# Patient Record
Sex: MALE | Race: WHITE | NOT HISPANIC OR LATINO | Employment: OTHER | URBAN - METROPOLITAN AREA
[De-identification: names, ages, dates, MRNs, and addresses within clinical notes are randomized per-mention and may not be internally consistent; named-entity substitution may affect disease eponyms.]

---

## 2022-09-06 ENCOUNTER — OFFICE VISIT (OUTPATIENT)
Dept: FAMILY MEDICINE | Facility: CLINIC | Age: 62
End: 2022-09-06
Payer: COMMERCIAL

## 2022-09-06 VITALS
OXYGEN SATURATION: 98 % | WEIGHT: 205.19 LBS | TEMPERATURE: 98 F | RESPIRATION RATE: 18 BRPM | DIASTOLIC BLOOD PRESSURE: 78 MMHG | HEART RATE: 78 BPM | BODY MASS INDEX: 25.51 KG/M2 | SYSTOLIC BLOOD PRESSURE: 146 MMHG | HEIGHT: 75 IN

## 2022-09-06 DIAGNOSIS — Z12.11 COLON CANCER SCREENING: ICD-10-CM

## 2022-09-06 DIAGNOSIS — I10 PRIMARY HYPERTENSION: ICD-10-CM

## 2022-09-06 DIAGNOSIS — M79.662 PAIN OF LEFT CALF: Primary | ICD-10-CM

## 2022-09-06 PROCEDURE — 99202 OFFICE O/P NEW SF 15 MIN: CPT | Mod: S$GLB,,, | Performed by: INTERNAL MEDICINE

## 2022-09-06 PROCEDURE — 99202 PR OFFICE/OUTPT VISIT, NEW, LEVL II, 15-29 MIN: ICD-10-PCS | Mod: S$GLB,,, | Performed by: INTERNAL MEDICINE

## 2022-09-06 RX ORDER — ASPIRIN 81 MG/1
81 TABLET ORAL DAILY
COMMUNITY

## 2022-09-06 RX ORDER — LISINOPRIL 10 MG/1
10 TABLET ORAL DAILY
COMMUNITY
End: 2022-09-06 | Stop reason: SDUPTHER

## 2022-09-06 RX ORDER — ZOLPIDEM TARTRATE 10 MG/1
10 TABLET ORAL NIGHTLY PRN
Qty: 30 TABLET | Refills: 1 | Status: SHIPPED | OUTPATIENT
Start: 2022-09-06

## 2022-09-06 RX ORDER — ZOLPIDEM TARTRATE 10 MG/1
5 TABLET ORAL NIGHTLY PRN
COMMUNITY
End: 2022-09-06 | Stop reason: SDUPTHER

## 2022-09-06 RX ORDER — LISINOPRIL 10 MG/1
10 TABLET ORAL DAILY
Qty: 90 TABLET | Refills: 1 | Status: SHIPPED | OUTPATIENT
Start: 2022-09-06 | End: 2023-01-30

## 2022-09-06 NOTE — PROGRESS NOTES
"Subjective:       Patient ID: Jose Luis Mohan is a 62 y.o. male.    Chief Complaint: Establish Care and Leg Pain (Leg and calf pain)    Here to establish care.  He has a h/o HTN managed by Cardiology but not followed by PCP.  BP normally well controlled and he actually takes meds every other day.  He is nervous today.  He recently noted some pain in is left calf described as a "frog".  It has slowly improved but a friend of his is a nurse and told him it could be clot.  He reports that his dad and a h/o clots in his legs; ? DVT.  The pain tends to worsen towards end of the day but overall it is slowly improving.  Worse depending on type of shoes he wears.  Walks all day for work.  No recent trips or prolonged immobility.  He brought some labs from February when he had an episode of acute knee pain.      Leg Pain   The incident occurred more than 1 week ago. There was no injury mechanism. The pain is present in the left leg. The quality of the pain is described as aching. The pain has been Fluctuating since onset. Pertinent negatives include no inability to bear weight, numbness or tingling. The symptoms are aggravated by movement, weight bearing and palpation.   Review of Systems   Constitutional: Negative.  Negative for activity change, appetite change, chills, diaphoresis, fatigue, fever and unexpected weight change.   HENT: Negative.  Negative for congestion, ear discharge, ear pain, hearing loss, nosebleeds, postnasal drip, rhinorrhea, sinus pressure, sinus pain, sneezing, sore throat, tinnitus, trouble swallowing and voice change.    Eyes: Negative.  Negative for photophobia, pain, discharge, redness, itching and visual disturbance.   Respiratory: Negative.  Negative for apnea, cough, choking, chest tightness, shortness of breath and wheezing.    Cardiovascular:  Negative for chest pain, palpitations and leg swelling.   Gastrointestinal:  Negative for abdominal distention, abdominal pain, blood in stool, " "constipation, diarrhea, nausea and vomiting.   Endocrine: Negative.  Negative for cold intolerance, heat intolerance, polydipsia and polyuria.   Genitourinary:  Negative for decreased urine volume, difficulty urinating, dysuria, enuresis, flank pain, frequency, genital sores, hematuria, penile discharge, penile pain, scrotal swelling, testicular pain and urgency.   Musculoskeletal:  Positive for arthralgias and myalgias. Negative for back pain, gait problem, joint swelling, neck pain and neck stiffness.   Skin: Negative.  Negative for rash and wound.   Allergic/Immunologic: Negative.  Negative for environmental allergies, food allergies and immunocompromised state.   Neurological: Negative.  Negative for dizziness, tingling, tremors, seizures, syncope, facial asymmetry, speech difficulty, weakness, light-headedness, numbness and headaches.   Hematological: Negative.  Negative for adenopathy. Does not bruise/bleed easily.   Psychiatric/Behavioral: Negative.  Negative for confusion, decreased concentration, hallucinations, self-injury, sleep disturbance and suicidal ideas. The patient is not nervous/anxious.      Past Medical History:   Diagnosis Date    Hypertension       Past Surgical History:   Procedure Laterality Date    BELPHAROPTOSIS REPAIR Bilateral        Family History   Problem Relation Age of Onset    Colon cancer Mother         80s    Deep vein thrombosis Father         patient reports "clot in legs", presumed DVT    Thyroid cancer Sister        Social History     Socioeconomic History    Marital status:    Tobacco Use    Smoking status: Some Days     Types: Cigarettes    Smokeless tobacco: Never   Substance and Sexual Activity    Alcohol use: Yes     Alcohol/week: 7.0 standard drinks     Types: 7 Shots of liquor per week    Drug use: Never    Sexual activity: Yes     Partners: Female       Current Outpatient Medications   Medication Sig Dispense Refill    aspirin (ECOTRIN) 81 MG EC tablet Take 81 " "mg by mouth once daily.      lisinopriL 10 MG tablet Take 10 mg by mouth once daily.      zolpidem (AMBIEN) 10 mg Tab Take 5 mg by mouth nightly as needed.       No current facility-administered medications for this visit.       Review of patient's allergies indicates:  No Known Allergies  Objective:    HPI     Leg Pain     Additional comments: Leg and calf pain          Last edited by Moira Can LPN on 9/6/2022  8:38 AM.      Blood pressure (!) 146/78, pulse 78, temperature 98.1 °F (36.7 °C), resp. rate 18, height 6' 3" (1.905 m), weight 93.1 kg (205 lb 3.2 oz), SpO2 98 %. Body mass index is 25.65 kg/m².   Physical Exam  Vitals and nursing note reviewed.   Constitutional:       General: He is not in acute distress.     Appearance: He is well-developed. He is not ill-appearing, toxic-appearing or diaphoretic.   HENT:      Head: Normocephalic and atraumatic.   Eyes:      General: No scleral icterus.        Right eye: No discharge.         Left eye: No discharge.      Conjunctiva/sclera: Conjunctivae normal.   Neck:      Vascular: No carotid bruit.   Cardiovascular:      Rate and Rhythm: Normal rate and regular rhythm.      Heart sounds: Normal heart sounds. No murmur heard.  Pulmonary:      Effort: Pulmonary effort is normal. No respiratory distress.      Breath sounds: Normal breath sounds. No decreased breath sounds, wheezing, rhonchi or rales.   Abdominal:      General: There is no distension.      Palpations: Abdomen is soft.      Tenderness: There is no abdominal tenderness. There is no guarding or rebound.   Musculoskeletal:      Right lower leg: No edema.      Left lower leg: Tenderness (mild discomfort mid left calf) present. No edema.      Comments: Negative Tan's.  No palpable cords.    Skin:     General: Skin is warm and dry.   Neurological:      Mental Status: He is alert.      Motor: No tremor.   Psychiatric:         Mood and Affect: Mood normal.         Speech: Speech normal.         Behavior: " Behavior normal.           Assessment:       1. Pain of left calf    2. Primary hypertension    3. Colon cancer screening        Plan:       Jose Luis was seen today for establish care and leg pain.    Diagnoses and all orders for this visit:    Pain of left calf  Comments:  Most likely a strain.  NSAIDs as needed.     Primary hypertension  Comments:  Normally 110s-120s/70s at home.  Nervous about seeing new doctor    Colon cancer screening  Comments:  He refuses colonoscopy.  Does have family history but mom was at advanced age and I'd rather do something than nothing  Orders:  -     Cologuard Screening (Multitarget Stool DNA); Future  -     Cologuard Screening (Multitarget Stool DNA)

## 2022-09-23 LAB — NONINV COLON CA DNA+OCC BLD SCRN STL QL: NEGATIVE
